# Patient Record
Sex: MALE | Employment: FULL TIME | URBAN - METROPOLITAN AREA
[De-identification: names, ages, dates, MRNs, and addresses within clinical notes are randomized per-mention and may not be internally consistent; named-entity substitution may affect disease eponyms.]

---

## 2021-07-07 ENCOUNTER — HOSPITAL ENCOUNTER (EMERGENCY)
Facility: MEDICAL CENTER | Age: 61
End: 2021-07-07
Attending: EMERGENCY MEDICINE
Payer: MEDICAID

## 2021-07-07 VITALS
HEART RATE: 98 BPM | DIASTOLIC BLOOD PRESSURE: 99 MMHG | OXYGEN SATURATION: 95 % | HEIGHT: 69 IN | TEMPERATURE: 98.5 F | WEIGHT: 220 LBS | RESPIRATION RATE: 18 BRPM | BODY MASS INDEX: 32.58 KG/M2 | SYSTOLIC BLOOD PRESSURE: 139 MMHG

## 2021-07-07 DIAGNOSIS — Z00.8 MEDICAL CLEARANCE FOR PSYCHIATRIC ADMISSION: ICD-10-CM

## 2021-07-07 PROCEDURE — 99283 EMERGENCY DEPT VISIT LOW MDM: CPT

## 2021-07-07 NOTE — DISCHARGE PLANNING
Anticipated Discharge Disposition: Psych    Action: Medically cleared. Legal hold completed. MD notes faxed to [facility] Accepted back with Dr Borges. [facility] completed and faxed ETA 3:45 set. Chart copy completed with original legal holds to be sent w pt RN will call report.    Barriers to Discharge: Transport 3:45    Plan: Transfer to [facility]

## 2021-07-07 NOTE — ED NOTES
Released to [facility] to return to [facility], pt was cooperative in the ED, he drank several juices and some water.
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
## 2021-07-24 ENCOUNTER — HOSPITAL ENCOUNTER (EMERGENCY)
Facility: MEDICAL CENTER | Age: 61
End: 2021-07-24
Attending: EMERGENCY MEDICINE
Payer: MEDICAID

## 2021-07-24 ENCOUNTER — TELEPHONE (OUTPATIENT)
Dept: OCCUPATIONAL MEDICINE | Facility: CLINIC | Age: 61
End: 2021-07-24

## 2021-07-24 VITALS
SYSTOLIC BLOOD PRESSURE: 183 MMHG | OXYGEN SATURATION: 93 % | BODY MASS INDEX: 34.84 KG/M2 | WEIGHT: 235.23 LBS | RESPIRATION RATE: 14 BRPM | HEIGHT: 69 IN | TEMPERATURE: 98 F | DIASTOLIC BLOOD PRESSURE: 97 MMHG | HEART RATE: 85 BPM

## 2021-07-24 DIAGNOSIS — S81.801A WOUND OF RIGHT LOWER EXTREMITY, INITIAL ENCOUNTER: ICD-10-CM

## 2021-07-24 DIAGNOSIS — R60.9 PERIPHERAL EDEMA: ICD-10-CM

## 2021-07-24 LAB
ANION GAP SERPL CALC-SCNC: 12 MMOL/L (ref 7–16)
BUN SERPL-MCNC: 16 MG/DL (ref 8–22)
CALCIUM SERPL-MCNC: 8.4 MG/DL (ref 8.5–10.5)
CHLORIDE SERPL-SCNC: 106 MMOL/L (ref 96–112)
CO2 SERPL-SCNC: 21 MMOL/L (ref 20–33)
CREAT SERPL-MCNC: 0.69 MG/DL (ref 0.5–1.4)
GLUCOSE SERPL-MCNC: 97 MG/DL (ref 65–99)
POTASSIUM SERPL-SCNC: 4.1 MMOL/L (ref 3.6–5.5)
SODIUM SERPL-SCNC: 139 MMOL/L (ref 135–145)

## 2021-07-24 PROCEDURE — 36415 COLL VENOUS BLD VENIPUNCTURE: CPT

## 2021-07-24 PROCEDURE — 304217 HCHG IRRIGATION SYSTEM

## 2021-07-24 PROCEDURE — 99283 EMERGENCY DEPT VISIT LOW MDM: CPT

## 2021-07-24 PROCEDURE — 80048 BASIC METABOLIC PNL TOTAL CA: CPT

## 2021-07-24 RX ORDER — IBUPROFEN 200 MG
200 TABLET ORAL EVERY 6 HOURS PRN
COMMUNITY

## 2021-07-24 RX ORDER — HYDROCHLOROTHIAZIDE 12.5 MG/1
12.5 TABLET ORAL DAILY
Qty: 30 TABLET | Refills: 0 | Status: SHIPPED | OUTPATIENT
Start: 2021-07-24

## 2021-07-24 RX ORDER — CEPHALEXIN 500 MG/1
500 CAPSULE ORAL 4 TIMES DAILY
Qty: 28 CAPSULE | Refills: 0 | Status: SHIPPED | OUTPATIENT
Start: 2021-07-24 | End: 2021-07-31

## 2021-07-24 ASSESSMENT — LIFESTYLE VARIABLES
DOES PATIENT WANT TO STOP DRINKING: NO
DO YOU DRINK ALCOHOL: NO

## 2021-07-24 ASSESSMENT — PAIN DESCRIPTION - PAIN TYPE: TYPE: ACUTE PAIN
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
## 2021-07-24 NOTE — ED NOTES
[facility], pt on monitor in room with call light in reach.  Chart up for ERP

## 2021-07-25 ENCOUNTER — PATIENT OUTREACH (OUTPATIENT)
Dept: HEALTH INFORMATION MANAGEMENT | Facility: OTHER | Age: 61
End: 2021-07-25

## 2021-07-25 NOTE — ED NOTES
"Pt discharged to lobby via ambulatory with steady gait, AOx4. Pt in possession of belongings. Pt provided discharge education and information pertaining to medications and follow up appointments. Pt received copy of discharge instructions and verbalized understanding.     BP (!) 183/97   Pulse 85   Temp 36.7 °C (98 °F) (Temporal)   Resp 14   Ht 1.753 m (5' 9\")   Wt 107 kg (235 lb 3.7 oz)   SpO2 93%   BMI 34.74 kg/m²   "
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
